# Patient Record
Sex: FEMALE | Race: WHITE
[De-identification: names, ages, dates, MRNs, and addresses within clinical notes are randomized per-mention and may not be internally consistent; named-entity substitution may affect disease eponyms.]

---

## 2020-08-23 ENCOUNTER — TRANSCRIPTION ENCOUNTER (OUTPATIENT)
Age: 25
End: 2020-08-23

## 2021-08-10 ENCOUNTER — TRANSCRIPTION ENCOUNTER (OUTPATIENT)
Age: 26
End: 2021-08-10

## 2021-11-04 ENCOUNTER — TRANSCRIPTION ENCOUNTER (OUTPATIENT)
Age: 26
End: 2021-11-04

## 2022-04-15 ENCOUNTER — TRANSCRIPTION ENCOUNTER (OUTPATIENT)
Age: 27
End: 2022-04-15

## 2022-12-21 PROBLEM — Z00.00 ENCOUNTER FOR PREVENTIVE HEALTH EXAMINATION: Status: ACTIVE | Noted: 2022-12-21

## 2023-01-03 ENCOUNTER — APPOINTMENT (OUTPATIENT)
Dept: ENDOCRINOLOGY | Facility: CLINIC | Age: 28
End: 2023-01-03

## 2023-02-16 ENCOUNTER — EMERGENCY (EMERGENCY)
Facility: HOSPITAL | Age: 28
LOS: 0 days | Discharge: HOME | End: 2023-02-16
Attending: STUDENT IN AN ORGANIZED HEALTH CARE EDUCATION/TRAINING PROGRAM
Payer: COMMERCIAL

## 2023-02-16 VITALS
TEMPERATURE: 97 F | DIASTOLIC BLOOD PRESSURE: 60 MMHG | RESPIRATION RATE: 16 BRPM | SYSTOLIC BLOOD PRESSURE: 118 MMHG | HEART RATE: 68 BPM | OXYGEN SATURATION: 99 %

## 2023-02-16 DIAGNOSIS — M79.645 PAIN IN LEFT FINGER(S): ICD-10-CM

## 2023-02-16 DIAGNOSIS — L03.012 CELLULITIS OF LEFT FINGER: ICD-10-CM

## 2023-02-16 DIAGNOSIS — M79.89 OTHER SPECIFIED SOFT TISSUE DISORDERS: ICD-10-CM

## 2023-02-16 PROCEDURE — 99053 MED SERV 10PM-8AM 24 HR FAC: CPT

## 2023-02-16 PROCEDURE — 99283 EMERGENCY DEPT VISIT LOW MDM: CPT

## 2023-02-16 PROCEDURE — 99284 EMERGENCY DEPT VISIT MOD MDM: CPT

## 2023-02-16 RX ORDER — ONDANSETRON 8 MG/1
1 TABLET, FILM COATED ORAL
Qty: 9 | Refills: 0
Start: 2023-02-16 | End: 2023-02-18

## 2023-02-16 NOTE — ED PROVIDER NOTE - PATIENT PORTAL LINK FT
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 Concerns include None. Denies symptoms frequency, urgency, intermittency, incontinence, incomplete emptying, weak stream, nocturia, hematuria, dysuria and straining. Denies known Latex allergy or symptoms of Latex sensitivity. Medications verified, no changes. Refill        Sexual Health Inventory for Men (ARIES)          done on 8/30/2019  How do you rate your confidence that you could get and keep an erection? 3   moderate   When you had erections with sexual stimulation, how often were your erections hard enough for penetration (entering your partner)? 3   sometimes (about 1/2)   During sexual intercourse, how often were you able to maintain your erection after you had penetrated (entered) your partner? 3   sometimes (about 1/2)   During sexual intercourse, how difficult was it to maintain your erection to completion of intercourse?   3   difficult   When you attempted sexual intercourse, how often was it satisfactory for you?  3   sometimes (about 1/2)    TOTAL SCORE:  15 with medication     1 -7 Severe ED         8 - 11 Moderate ED         12-16  Mild to Moderate ED         17 - 21 Mild ED You can access the FollowMyHealth Patient Portal offered by [facility] by registering at the following website: [email or web address]. By joining [employer]’s FollowMyHealth portal, you will also be able to view your health information using other applications (apps) compatible with our system.

## 2023-02-16 NOTE — ED PROVIDER NOTE - OBJECTIVE STATEMENT
27 year old right hand dominant female presents for eval for left 3rd digit pain. 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 Did not go through and through. She was seen in urgent care, Rx augmentin and has taken 1 dose but it made her feel nauseous. She iced it one time on the day of injury, but not since. Pain dull aching, associated with swelling to the ventral aspect of the finger, pain exacerbated with active ROM.  No focal weakness or numbness.

## 2023-02-16 NOTE — ED PROVIDER NOTE - CLINICAL SUMMARY MEDICAL DECISION MAKING FREE TEXT BOX
27 year old right hand dominant female presents for eval for left 3rd digit pain. 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 Did not go through and through. She was seen in urgent care, Rx augmentin and has taken 1 dose but it made her feel nauseous. No Knavel signs on exam. Presentation not c/w flexor tenosynovitis. Pt offered MRSA coverage for cellulitis with Bactrim and recommend cont augmentin use, ice, elevation, ibuprofen as needed for pain. Discussed return precautions incl worsening redness, streaking after at least 3 days of appropriate abx. Offered zofran prn for nausea.

## 2023-02-16 NOTE — ED ADULT NURSE NOTE - NSIMPLEMENTINTERV_GEN_ALL_ED
Implemented All Universal Safety Interventions:  [place] to call system. Call bell, personal items and telephone within reach. Instruct patient to call for assistance. Room bathroom lighting operational. Non-slip footwear when patient is off stretcher. Physically safe environment: no spills, clutter or unnecessary equipment. Stretcher in lowest position, wheels locked, appropriate side rails in place.

## 2023-02-16 NOTE — ED ADULT TRIAGE NOTE - CHIEF COMPLAINT QUOTE
R ring finger pain with slight swelling after cutting finger on pts brace yesterday. 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

## 2023-02-16 NOTE — ED PROVIDER NOTE - NS ED ROS FT
Constitutional: See HPI.  MS: + finger pain, No myalgia, muscle weakness, or back pain.  Neuro: No focal weakness  Skin: + warmth to finger  Except as documented in the HPI, all other systems are negative.

## 2023-02-16 NOTE — ED PROVIDER NOTE - PHYSICAL EXAMINATION
Gen - NAD, Extremities - L 3rd digit with punctate area of prior injury to dorsal aspect with surrounding erythema, swelling. No circumferential swelling, no ttp flexor tendon sheath, no pain with passive extension of the finger. no ecchymosis, brisk cap refill, Neuro - A&O x3, equal strength and sensation, non-focal exam

## 2023-02-16 NOTE — ED PROVIDER NOTE - NSFOLLOWUPINSTRUCTIONS_ED_ALL_ED_FT
Please ice for 20mins at a time, keep your finger elevated above your heart to help with swelling. Take ibuprofen 600mg with food every 8 hours as needed for pain.     You should appreciate significant improvement in 3 days. Please have a medical provider re-evaluate your hand in 7 days or sooner if worsening.     Cellulitis    Cellulitis is a skin infection caused by bacteria. This condition occurs most often in the arms and lower legs but can occur anywhere over the body. Symptoms include redness, swelling, warm skin, tenderness, and chills/fever. If you were prescribed an antibiotic medicine, take it as told by your health care provider. Do not stop taking the antibiotic even if you start to feel better.    SEEK IMMEDIATE MEDICAL CARE IF YOU HAVE THE FOLLOWING SYMPTOMS: worsening fever, red streaks coming from affected area, vomiting or diarrhea, or dizziness/lightheadedness.

## 2025-01-07 ENCOUNTER — NON-APPOINTMENT (OUTPATIENT)
Age: 30
End: 2025-01-07

## 2025-05-05 ENCOUNTER — NON-APPOINTMENT (OUTPATIENT)
Age: 30
End: 2025-05-05